# Patient Record
Sex: MALE | Race: WHITE
[De-identification: names, ages, dates, MRNs, and addresses within clinical notes are randomized per-mention and may not be internally consistent; named-entity substitution may affect disease eponyms.]

---

## 2022-06-19 ENCOUNTER — HOSPITAL ENCOUNTER (EMERGENCY)
Dept: HOSPITAL 54 - ER | Age: 22
LOS: 1 days | Discharge: HOME | End: 2022-06-20
Payer: MEDICAID

## 2022-06-19 VITALS — BODY MASS INDEX: 24.92 KG/M2 | HEIGHT: 71 IN | WEIGHT: 178 LBS
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
## 2022-06-19 DIAGNOSIS — Z20.822: ICD-10-CM

## 2022-06-19 DIAGNOSIS — T40.492A: Primary | ICD-10-CM

## 2022-06-19 DIAGNOSIS — F32.A: ICD-10-CM

## 2022-06-19 LAB
ALBUMIN SERPL BCP-MCNC: 4.1 G/DL (ref 3.4–5)
ALP SERPL-CCNC: 86 U/L (ref 46–116)
ALT SERPL W P-5'-P-CCNC: 25 U/L (ref 12–78)
APAP SERPL-MCNC: < 2 UG/ML (ref 10–30)
AST SERPL W P-5'-P-CCNC: 22 U/L (ref 15–37)
BASOPHILS # BLD AUTO: 0.1 K/UL (ref 0–0.2)
BASOPHILS NFR BLD AUTO: 1.1 % (ref 0–2)
BILIRUB DIRECT SERPL-MCNC: 0.1 MG/DL (ref 0–0.2)
BILIRUB SERPL-MCNC: 0.6 MG/DL (ref 0.2–1)
BILIRUB UR QL STRIP: NEGATIVE
BUN SERPL-MCNC: 13 MG/DL (ref 7–18)
CALCIUM SERPL-MCNC: 9.5 MG/DL (ref 8.5–10.1)
CHLORIDE SERPL-SCNC: 102 MMOL/L (ref 98–107)
CO2 SERPL-SCNC: 26 MMOL/L (ref 21–32)
COLOR UR: YELLOW
CREAT SERPL-MCNC: 0.7 MG/DL (ref 0.6–1.3)
EOSINOPHIL NFR BLD AUTO: 1.8 % (ref 0–6)
ETHANOL SERPL-MCNC: < 3 MG/DL (ref 0–0)
GLUCOSE SERPL-MCNC: 110 MG/DL (ref 74–106)
GLUCOSE UR STRIP-MCNC: NEGATIVE MG/DL
HCT VFR BLD AUTO: 46 % (ref 39–51)
HGB BLD-MCNC: 15.9 G/DL (ref 13.5–17.5)
LEUKOCYTE ESTERASE UR QL STRIP: NEGATIVE
LYMPHOCYTES NFR BLD AUTO: 1.2 K/UL (ref 0.8–4.8)
LYMPHOCYTES NFR BLD AUTO: 16 % (ref 20–44)
MCHC RBC AUTO-ENTMCNC: 34 G/DL (ref 31–36)
MCV RBC AUTO: 90 FL (ref 80–96)
MONOCYTES NFR BLD AUTO: 0.6 K/UL (ref 0.1–1.3)
MONOCYTES NFR BLD AUTO: 7.8 % (ref 2–12)
NEUTROPHILS # BLD AUTO: 5.7 K/UL (ref 1.8–8.9)
NEUTROPHILS NFR BLD AUTO: 73.3 % (ref 43–81)
NITRITE UR QL STRIP: NEGATIVE
PH UR STRIP: 8 [PH] (ref 5–8)
PLATELET # BLD AUTO: 203 K/UL (ref 150–450)
POTASSIUM SERPL-SCNC: 4 MMOL/L (ref 3.5–5.1)
PROT SERPL-MCNC: 7.6 G/DL (ref 6.4–8.2)
PROT UR QL STRIP: NEGATIVE MG/DL
RBC # BLD AUTO: 5.17 MIL/UL (ref 4.5–6)
SODIUM SERPL-SCNC: 137 MMOL/L (ref 136–145)
UROBILINOGEN UR STRIP-MCNC: 0.2 EU/DL
WBC NRBC COR # BLD AUTO: 7.8 K/UL (ref 4.3–11)

## 2022-06-19 PROCEDURE — 87426 SARSCOV CORONAVIRUS AG IA: CPT

## 2022-06-19 PROCEDURE — 36415 COLL VENOUS BLD VENIPUNCTURE: CPT

## 2022-06-19 PROCEDURE — G0480 DRUG TEST DEF 1-7 CLASSES: HCPCS

## 2022-06-19 PROCEDURE — 85025 COMPLETE CBC W/AUTO DIFF WBC: CPT

## 2022-06-19 PROCEDURE — 99285 EMERGENCY DEPT VISIT HI MDM: CPT

## 2022-06-19 PROCEDURE — 80076 HEPATIC FUNCTION PANEL: CPT

## 2022-06-19 PROCEDURE — 80307 DRUG TEST PRSMV CHEM ANLYZR: CPT

## 2022-06-19 PROCEDURE — 80048 BASIC METABOLIC PNL TOTAL CA: CPT

## 2022-06-19 PROCEDURE — 81003 URINALYSIS AUTO W/O SCOPE: CPT

## 2022-06-19 PROCEDURE — C9803 HOPD COVID-19 SPEC COLLECT: HCPCS

## 2022-06-19 PROCEDURE — 80143 DRUG ASSAY ACETAMINOPHEN: CPT

## 2022-06-19 PROCEDURE — 80320 DRUG SCREEN QUANTALCOHOLS: CPT

## 2022-06-19 NOTE — NUR
PT RESTING COMFORTABLY IN BED, PROVIDED HIM WITH FOOD AND WATER. 1:1 SITTER. 
WILL CONTINUE TO MONITOR.

## 2022-06-19 NOTE — NUR
SPOKE TO ADAM OF POISON CONTROL: SAID TO WATCH PATIENT FOR CNS AND 
RESPIRATORY DEPRESSION DELAYED EFFECT, CAN GIVE NARCAN AS NEEDED FROM 10-15MG 
MAXIMIMUM DOSE. NEEDS TO BE OBSERVED FOR 12HRS MINIMUM. DO TYLENOL AND ASPIRIN 
SCREEN, BLOOD ALCOHOL LEVEL, AND DRUG SCREEN. DR KING INFORMED

## 2022-06-19 NOTE — NUR
TO ER BED 14, NELI FROM HOME C/O OVERDOSE TOOK 13 OF SUBOXONE, DENIES SI/HI, 
"MY MOM IS KICKING ME OUT OF THE HOUSE", COOPERATIVE, AAOX3, BREATHING EVEN AND 
NON LABORED, CONNECTED TO MONITOR, AWAITING MD ORDERS

## 2022-06-19 NOTE — NUR
THE PATIENT IS ALERT AND ORIENTED X4. IN ROOM AIR AND DENIES SOB. RESPIRATION 
REGULAR AND UNLABORED. DENIES PAIN. WILL CONTINUE TO MONITOR THE PATIENT.

SITTER AT THE BEDSIDE.

## 2022-06-20 VITALS — SYSTOLIC BLOOD PRESSURE: 119 MMHG | DIASTOLIC BLOOD PRESSURE: 79 MMHG

## 2022-06-20 NOTE — NUR
CALLED CRISIS CLINICIAN FOR PSYCH EVALUATION. CRISIS CLINICIAN BEV CALLED 
BACK AND SAID A CRISIS CLINICIAN WILL EVALUATE THE PATIENT IN THE MORNING.

## 2022-06-20 NOTE — NUR
SS Note:



Pt. Is a 21-year-old  male who demonstrates adequate insight to the 
reason for hospitalization. Per pt., he was kicked out of his mom's house due 
to an argument. Per EMR, pt. took 13 Suboxone tablets after argument. Pt. was 
oriented x4, alert, and cooperative. During interview, pt. was capable of 
following directions and appeared groomed. Pt.'s speech was at a normal rate 
and pt.'s mood was elevated. Pt. reported no hx of mental health, substance 
abuse, suicidal ideation, or homicidal ideation. Pt. denies auditory 
hallucinations, visual hallucinations, paranoia, or delusions. SW explored 
pt.'s living situation. Per pt., [place] [facility] [facility] [facility] [address] SW spoke with Mother Minerva [[phone]] and confirmed 
that pt. can go back home. Per mother, sister will be collecting pt. [Sister 
Merlyn [phone]]. 



Plan: SW provided available resources and pt. accepted rejected. Brandy BLACK 
evaluated pt. and he is clear to go home. Upon discharge, sister will be 
collecting pt.

## 2022-06-20 NOTE — NUR
INDU spoke with mother [Minerva [phone]] and confirmed that pt. can go back 
home. Per Minerva, sister will be picking pt. up. ETA: 30 mins

## 2022-06-20 NOTE — NUR
-------------------------------------------------------------------------------

          *** Note undone in [facility] - 06/20/22 at 0725 by SHAHRAM ***          

-------------------------------------------------------------------------------

room [id number]